# Patient Record
Sex: FEMALE
[De-identification: names, ages, dates, MRNs, and addresses within clinical notes are randomized per-mention and may not be internally consistent; named-entity substitution may affect disease eponyms.]

---

## 2018-04-20 ENCOUNTER — HOSPITAL ENCOUNTER (EMERGENCY)
Dept: HOSPITAL 17 - PHEFT | Age: 43
Discharge: HOME | End: 2018-04-20
Payer: COMMERCIAL

## 2018-04-20 VITALS — WEIGHT: 203.71 LBS | BODY MASS INDEX: 36.09 KG/M2 | HEIGHT: 63 IN

## 2018-04-20 VITALS
OXYGEN SATURATION: 96 % | TEMPERATURE: 99.1 F | RESPIRATION RATE: 16 BRPM | SYSTOLIC BLOOD PRESSURE: 158 MMHG | HEART RATE: 97 BPM | DIASTOLIC BLOOD PRESSURE: 77 MMHG

## 2018-04-20 DIAGNOSIS — E78.00: ICD-10-CM

## 2018-04-20 DIAGNOSIS — F90.9: ICD-10-CM
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
## 2018-04-20 DIAGNOSIS — Z79.899: ICD-10-CM

## 2018-04-20 DIAGNOSIS — K21.9: ICD-10-CM

## 2018-04-20 DIAGNOSIS — I10: ICD-10-CM

## 2018-04-20 DIAGNOSIS — S86.812A: Primary | ICD-10-CM

## 2018-04-20 DIAGNOSIS — F17.200: ICD-10-CM
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
## 2018-04-20 PROCEDURE — 29515 APPLICATION SHORT LEG SPLINT: CPT

## 2018-04-20 PROCEDURE — E0113 CRUTCH UNDERARM EACH WOOD: HCPCS

## 2018-04-20 PROCEDURE — 99283 EMERGENCY DEPT VISIT LOW MDM: CPT

## 2018-04-20 PROCEDURE — 96372 THER/PROPH/DIAG INJ SC/IM: CPT

## 2018-04-20 NOTE — PD
HPI


Chief Complaint:  Pain: Acute or Chronic


Time Seen by Provider:  13:11


Travel History


International Travel<30 days:  No


Contact w/Intl Traveler<30days:  No


Traveled to known affect area:  No





History of Present Illness


HPI


43-year-old female here with left calf pain since this morning.  She reports 

while playing pickle ball she pivoted on the left foot and felt a sharp pain in 

her left calf.  The area began to swell.  She reports loss of muscle definition 

of the calf muscle.  She is able to flex and extend the foot although this 

causes discomfort in the calf.  She reports the pain is constant, throbbing.  

Worse with palpation of the area and movement of the ankle and foot.  Relieved 

with rest.  Denies altered sensation or weakness of the extremity.  Symptom 

severity is moderate.





PFSH


Past Medical History


Hx Anticoagulant Therapy:  No


ADHD:  Yes


Cardiovascular Problems:  Yes (HTN, CHOL)


High Cholesterol:  Yes


Diabetes:  No


Diminished Hearing:  No


GERD:  Yes


Hypertension:  Yes


Tetanus Vaccination:  Unknown


Influenza Vaccination:  No


Pregnant?:  Not Pregnant


LMP:  NOW





Social History


Alcohol Use:  Yes (RARELY)


Tobacco Use:  Yes (1 PPD)


Substance Use:  No





Allergies-Medications


(Allergen,Severity, Reaction):  


Coded Allergies:  


     iodine (Verified  Allergy, Severe, Anaphylaxis, 4/20/18)


     nickel (Verified  Allergy, Intermediate, RASH, 4/20/18)


Reported Meds & Prescriptions





Reported Meds & Active Scripts


Active


Norco (Hydrocodone-Acetaminophen) 5 Mg-325 Mg Tab 1 Tab PO Q6H PRN


Ibuprofen 800 Mg Tab 800 Mg PO Q6HR PRN


Reported


Prevacid Solutab ODT (Lansoprazole) 30 Mg Tab 30 Mg PO DAILY


     Mix with 4 ml water before giving via tube.


Adderall (Amphetamine-Dextroamphetamine) 20 Mg Tab 20 Mg PO DAILY


     Avoid late evening doses. Space doses at least 4 to 6 hours if more


     than once/day dosing.


Losartan (Losartan Potassium) 100 Mg Tab 100 Mg PO DAILY


Atorvastatin (Atorvastatin Calcium) 80 Mg Tab 80 Mg PO HS








Review of Systems


Except as stated in HPI:  all other systems reviewed are Neg


General / Constitutional:  No: Fever





Physical Exam


Narrative


GENERAL: Alert and well-appearing 43-year-old female


SKIN: Warm and dry.


HEAD: Normocephalic.


EYES: No scleral icterus. No injection or drainage. 


NECK: Supple, trachea midline.


CARDIOVASCULAR: Regular rate and rhythm without murmurs, gallops, or rubs. 


RESPIRATORY: Breath sounds equal bilaterally. No accessory muscle use.


GASTROINTESTINAL: Abdomen soft, non-tender, nondistended. 


MUSCULOSKELETAL: No cyanosis.  Left lower extremity: +TTP mild swelling to the 

left calf.  Compartments are soft.  Negative Plaza test. patient can 

plantarflex and dorsiflex the foot.  Sensation with sharp/dull recognition 

intact.  Palpable DP pulse.  Brisk cap refill


BACK: Nontender without obvious deformity. No CVA tenderness.








Data


Data


Last Documented VS





Vital Signs








  Date Time  Temp Pulse Resp B/P (MAP) Pulse Ox O2 Delivery O2 Flow Rate FiO2


 


4/20/18 12:54  97 16     


 


4/20/18 12:33 99.1   158/77 (104) 96   








Orders





 Orders


Splint Or Brace Apply/Monitor (4/20/18 13:45)


Ketorolac Inj (Toradol Inj) (4/20/18 14:00)








MDM


Medical Decision Making


Medical Screen Exam Complete:  Yes


Emergency Medical Condition:  Yes


Differential Diagnosis


Calf muscle tear, Achilles tendon injury, strain/strain


Narrative Course


43-year-old female here with calf muscle injury.  Achilles tendon appears to be 

intact.  She is able to plantarflex and dorsiflex the foot.  She does have 

swelling in the left calf.  Compartments are soft.  Posterior short leg splint 

applied.  Crutches provided.  Patient is to follow-up with orthopedic





Diagnosis





 Primary Impression:  


 Strain of calf muscle


 Qualified Codes:  S86.812A - Strain of other muscle(s) and tendon(s) at lower 

leg level, left leg, initial encounter


Referrals:  


Esteban Jarquin Jr., MD,Ezekiel Wagner MD, MD





Orthopedist





***Additional Instructions:  


Splint as directed.


Just for weightbearing.


Medication as directed.


Schedule follow-up appointment with orthopedic.


Return if he have new or worsening symptoms


Scripts


Hydrocodone-Acetaminophen (Norco) 5 Mg-325 Mg Tab


1 TAB PO Q6H Y for PAIN, #10 TAB 0 Refills


   Prov: Narda Christine         4/20/18 


Ibuprofen (Ibuprofen) 800 Mg Tab


800 MG PO Q6HR Y for PAIN, #40 TAB 0 Refills


   Prov: Narda Christine         4/20/18


Disposition:  01 DISCHARGE HOME


Condition:  Stable











Narda Christine Apr 20, 2018 13:52